# Patient Record
Sex: MALE | Race: WHITE
[De-identification: names, ages, dates, MRNs, and addresses within clinical notes are randomized per-mention and may not be internally consistent; named-entity substitution may affect disease eponyms.]

---

## 2017-01-30 ENCOUNTER — HOSPITAL ENCOUNTER (EMERGENCY)
Dept: HOSPITAL 10 - FTE | Age: 42
Discharge: HOME | End: 2017-01-30
Payer: MEDICAID

## 2017-01-30 VITALS
BODY MASS INDEX: 31.02 KG/M2 | HEIGHT: 69 IN | WEIGHT: 209.44 LBS | HEIGHT: 69 IN | WEIGHT: 209.44 LBS | BODY MASS INDEX: 31.02 KG/M2

## 2017-01-30 DIAGNOSIS — N39.0: Primary | ICD-10-CM

## 2017-01-30 LAB — URINE BLOOD (DIP) POC: (no result)

## 2017-01-30 PROCEDURE — 81003 URINALYSIS AUTO W/O SCOPE: CPT

## 2017-01-30 PROCEDURE — 99283 EMERGENCY DEPT VISIT LOW MDM: CPT

## 2017-01-31 NOTE — ERD
ER Documentation


Chief Complaint


Date/Time


DATE: 1/31/17 


TIME: 01:55


Chief Complaint


Burning with urination and blood for 1 day





HPI


This is a 41 year old male presenting to ER for dysuria and hematuria x 1 day.  

No frequent or urgent urination.  No difficulty urinating.  No penile discharge 

or itching. No new sexual partners.  No testicular or scrotal pain.  No back or 

abdominal pain.  No nausea or vomiting.  Patient has not checked his 

temperature at home.





ROS


All systems reviewed and are negative except as per history of present illness.





Medications


Home Meds


Active Scripts


Phenazopyridine Hcl* (Pyridium*) 100 Mg Tab, 100 MG PO TID Y for URINARY PAIN, #

8 TAB


   Prov:RANDY BYRNE NP         1/30/17


Ciprofloxacin Hcl* (Ciprofloxacin Hcl*) 500 Mg Tablet, 500 MG PO BID for 3 Days

, TAB


   Prov:RANDY BYRNE NP         1/30/17


Hydralazine Hcl* (Hydralazine Hcl*) 50 Mg Tab, 50 MG PO Q6, #120 TAB


   Prov:HARMAN BURNS         10/4/16





Allergies


Allergies:  


Coded Allergies:  


     No Known Allergy (Unverified , 10/3/16)





PMhx/Soc


Medical and Surgical Hx:  pt denies Medical Hx, pt denies Surgical Hx


History of Surgery:  No


Anesthesia Reaction:  No


Hx Neurological Disorder:  No


Hx Respiratory Disorders:  No


Hx Cardiac Disorders:  No


Hx Psychiatric Problems:  No


Hx Miscellaneous Medical Probl:  No


Hx Alcohol Use:  No


Hx Substance Use:  No


Hx Tobacco Use:  No


Smoking Status:  Never smoker





Physical Exam


Vitals





Vital Signs








  Date Time  Temp Pulse Resp B/P Pulse Ox O2 Delivery O2 Flow Rate FiO2


 


1/30/17 20:59 101.7 121 20 135/73 96   








Physical Exam


Const:               no acute distress, alert


Head:   Atraumatic 


Eyes:    Normal Conjunctiva


ENT:    Normal External Ears, Nose and Mouth.


Neck:               Full range of motion..~ No meningismus.


Resp:    Clear to auscultation bilaterally


Cardio:    Regular rate and rhythm, no murmurs


Abd:    Soft, non tender, non distended. Normal bowel sounds


Skin:    No petechiae or rashes


Back:    No midline or flank tenderness.  no cva tenderness


Ext:    No cyanosis, or edema


Neur:    Awake and alert


Psych:    Normal Mood and Affect


Results 24 hrs





 Laboratory Tests








Test


  1/30/17


23:13


 


Bedside Urine Blood 3+ 


 


Bedside Urine Glucose (UA) Negative 


 


Bedside Urine Ketones (LAB) Negative 


 


Bedside Urine Leukocyte


Esterase (L 3+ 


 


 


Bedside Urine Nitrite (LAB) Negative 


 


Bedside Urine Protein (LAB) 1+ 


 


Bedside Urine pH (LAB) 5.5 








 Current Medications








 Medications


  (Trade)  Dose


 Ordered  Sig/Roger


 Route


 PRN Reason  Start Time


 Stop Time Status Last Admin


Dose Admin


 


 Acetaminophen


  (Tylenol Tab)  500 mg  ONCE  STAT


 PO


   1/30/17 22:55


 1/30/17 22:56 DC 1/30/17 23:21


 











Procedures/MDM


ED COURSE:


The patient was stable throughout ED course. I kept the patient and/or family 

informed of laboratory and diagnostic imaging results throughout the ED course.

  


Tylenol given.





Laboratory


Urine dip shows 3+ blood and 3+ leukocyte esterase








MDM: 41 year old male presents to ER for dysuria and hematuria x 1 day.  Temp 

of 101.7F upon arrival to ED and Tylenol given.  Fever reduced.  Urine 

indicates UTI.  Denies back pain.  No CVA tenderness.  Patient remains calm and 

comfortable throughout ED visit.  Hemodynamically stable.  





Low suspicion for pyelonephritis.  Patient's diagnosis is UTI.





Patient is appropriate for outpatient management and will be discharged with 

Cipro and Pyridium. Instructed patient follow up with PCP in the next 2-3 days 

for reassessment.  Return to ED for any new or worsening symptoms.  Patient 

verbalizes understanding.  All questions answered at discharge.





Departure


Diagnosis:  


 Primary Impression:  


 UTI (urinary tract infection)


 Urinary tract infection type:  site unspecified  Hematuria presence:  with 

hematuria  Qualified Code:  N39.0 - Urinary tract infection with hematuria, 

site unspecified


Condition:  Stable


Patient Instructions:  Understanding Urinary Tract Infections (UTIs)


Referrals:  


[facility]


Usted se ha hecho un examen mdico de control que le indica que no est en librado 

condicin que requiera tratamiento urgente en el Departamento de Emergencia. Un 

estudio ms profundo y el tratamiento de issa condicin pueden esperar sin ningn 

riesgo hasta que usted sea atendida/o en el consultorio de issa mdico o librado cl

jameson. Es responsabilidad suya arreglar librado caprice para el seguimiento del brock. 





MANEJO DE CONDICIONES NO URGENTES EN EL FUTURO


1) Si usted tiene un mdico de atencin primaria:





Usted debera llamar a issa mdico de atencin primaria antes de venir al 

departamento de emergencia. Despus de las horas de consultorio, issa doctor o issa 

asociado/a est disponible por telfono. El mdico o enfermero de kala en el 

servicio telefnico puede asesorarle por sd medio para atender el problema, o 

brock contrario se puede programar librado caprice.





2) Si usted no tiene un mdico de atencin primaria:


Llame al mdico o clnica de referencia que aparece abajo martina las horas de 

consultorio para hacer librado caprice para que le vean.





CLINICAS:


[facility]  [phone] [address] [facility]  [phone] [address] [facility] [phone] [address] [facility]  [phone] [address] [facility] [place] [id number] [phone] [facility] [phone] [address] [facility]. [facility] ()


Usted se ha hecho un examen mdico de control que le indica que no est en librado 

condicin que requiera tratamiento urgente en el Departamento de Emergencia. Un 

estudio ms profundo y el tratamiento de issa condicin pueden esperar sin ningn 

riesgo hasta que usted sea atendida/o en el consultorio de issa mdico o librado cl

jameson. Es responsabilidad suya arreglar librado caprice para el seguimiento del brock. 





MANEJO DE CONDICIONES NO URGENTES EN EL FUTURO


1) Si usted tiene un mdico de atencin primaria:





Usted debera llamar a issa mdico de atencin primaria antes de venir al 

departamento de emergencia. Despus de las horas de consultorio, issa doctor o issa 

asociado/a est disponible por telfono. El mdico o enfermero de kala en el 

servicio telefnico puede asesorarle por sd medio para atender el problema, o 

brock contrario se puede programar librado caprice.





2) Si usted no tiene un mdico de atencin primaria:


Llame al mdico o condado institucions de referencia que aparece abajo martina 

las horas de consultorio para hacer librado caprice para que le vean.








SI USTED NO PUEDE PAGAR PARA NILTON UN MEDICO puede ir a:


[facility] 


[address] [place], [place] [id number] [place] [address] 


[address], [place] [id number] [place] [address]+[facility] 


[address] [place] [address]





[place]


[facility]


[address] [id number] [phone]





Additional Instructions:  


Call your primary care doctor TOMORROW for an appointment during the next 2-3 

days.See the doctor sooner or return here if your condition worsens before your 

appointment time.





Return to ED for any high fever, chest pain, difficulty breathing, shortness 

breath, wheezing, vomiting, diarrhea, abdominal pain or any new or worsening 

symptoms.











RANDY BYRNE NP Jan 31, 2017 01:55

## 2017-07-12 ENCOUNTER — HOSPITAL ENCOUNTER (EMERGENCY)
Age: 42
Discharge: HOME | End: 2017-07-12

## 2017-07-12 DIAGNOSIS — K80.50: Primary | ICD-10-CM

## 2017-07-12 DIAGNOSIS — I10: ICD-10-CM

## 2017-07-12 PROCEDURE — 85025 COMPLETE CBC W/AUTO DIFF WBC: CPT

## 2017-07-12 PROCEDURE — 96361 HYDRATE IV INFUSION ADD-ON: CPT

## 2017-07-12 PROCEDURE — 96374 THER/PROPH/DIAG INJ IV PUSH: CPT

## 2017-07-12 PROCEDURE — 36415 COLL VENOUS BLD VENIPUNCTURE: CPT

## 2017-07-12 PROCEDURE — 83690 ASSAY OF LIPASE: CPT

## 2017-07-12 PROCEDURE — 81003 URINALYSIS AUTO W/O SCOPE: CPT

## 2017-07-12 PROCEDURE — 80053 COMPREHEN METABOLIC PANEL: CPT

## 2017-07-12 PROCEDURE — 96375 TX/PRO/DX INJ NEW DRUG ADDON: CPT

## 2018-10-17 ENCOUNTER — HOSPITAL ENCOUNTER (EMERGENCY)
Age: 43
Discharge: HOME | End: 2018-10-17

## 2018-10-17 ENCOUNTER — HOSPITAL ENCOUNTER (EMERGENCY)
Dept: HOSPITAL 91 - FTE | Age: 43
Discharge: HOME | End: 2018-10-17
Payer: MEDICAID

## 2018-10-17 DIAGNOSIS — M54.41: Primary | ICD-10-CM

## 2018-10-17 PROCEDURE — 96372 THER/PROPH/DIAG INJ SC/IM: CPT

## 2018-10-17 PROCEDURE — 99284 EMERGENCY DEPT VISIT MOD MDM: CPT

## 2018-10-17 RX ADMIN — KETOROLAC TROMETHAMINE 1 MG: 30 INJECTION, SOLUTION INTRAMUSCULAR at 23:12

## 2018-10-17 RX ADMIN — DEXAMETHASONE SODIUM PHOSPHATE 1 MG: 4 INJECTION, SOLUTION INTRAMUSCULAR; INTRAVENOUS at 23:22
